# Patient Record
Sex: MALE | Race: BLACK OR AFRICAN AMERICAN | Employment: OTHER | URBAN - METROPOLITAN AREA
[De-identification: names, ages, dates, MRNs, and addresses within clinical notes are randomized per-mention and may not be internally consistent; named-entity substitution may affect disease eponyms.]

---

## 2020-10-13 ENCOUNTER — HOSPITAL ENCOUNTER (EMERGENCY)
Age: 50
Discharge: HOME | End: 2020-10-13
Attending: EMERGENCY MEDICINE

## 2020-10-13 VITALS
BODY MASS INDEX: 24.92 KG/M2 | DIASTOLIC BLOOD PRESSURE: 84 MMHG | SYSTOLIC BLOOD PRESSURE: 135 MMHG | RESPIRATION RATE: 16 BRPM | TEMPERATURE: 97.3 F | HEART RATE: 74 BPM | WEIGHT: 178 LBS | HEIGHT: 71 IN | OXYGEN SATURATION: 99 %

## 2020-10-13 PROCEDURE — 6370000000 HC RX 637 (ALT 250 FOR IP): Performed by: STUDENT IN AN ORGANIZED HEALTH CARE EDUCATION/TRAINING PROGRAM

## 2020-10-13 PROCEDURE — 99282 EMERGENCY DEPT VISIT SF MDM: CPT

## 2020-10-13 RX ORDER — CYCLOBENZAPRINE HCL 10 MG
10 TABLET ORAL ONCE
Status: COMPLETED | OUTPATIENT
Start: 2020-10-13 | End: 2020-10-13

## 2020-10-13 RX ORDER — CYCLOBENZAPRINE HCL 10 MG
10 TABLET ORAL 3 TIMES DAILY PRN
Qty: 21 TABLET | Refills: 0 | Status: SHIPPED | OUTPATIENT
Start: 2020-10-13 | End: 2020-10-23

## 2020-10-13 RX ORDER — KETOROLAC TROMETHAMINE 30 MG/ML
30 INJECTION, SOLUTION INTRAMUSCULAR; INTRAVENOUS ONCE
Status: DISCONTINUED | OUTPATIENT
Start: 2020-10-13 | End: 2020-10-13 | Stop reason: HOSPADM

## 2020-10-13 RX ADMIN — CYCLOBENZAPRINE 10 MG: 10 TABLET, FILM COATED ORAL at 13:08

## 2020-10-13 ASSESSMENT — ENCOUNTER SYMPTOMS
ABDOMINAL PAIN: 0
CONSTIPATION: 0
SORE THROAT: 0
VOMITING: 0
SHORTNESS OF BREATH: 0
DIARRHEA: 0
NAUSEA: 0

## 2020-10-13 NOTE — ED PROVIDER NOTES
none    Jamil Blackman MD, Riky Khanna  Attending Emergency  Physician             Jamil Blackman MD  10/13/20 3765

## 2020-10-13 NOTE — ED NOTES
[facility]  [address]  Phone: [phone]  Fax: [phone]    Patient Appointment Information    To schedule an appointment at the [facility] for a patient please call:    [phone] for [address] / [phone] for Adults    Appointments for children are available the day the call is placed. Adult appointments are generally available within 48 to 72 hours. Information required making an appointment:    Jude Juarez  44 y.o. 1970 [phone] (home)    Chief Complaint   Patient presents with    Dental Pain     2 teeth causing pain \"for years\" Generalized body aches         Appointment day and time: October 19, 2020at 10 am    Please as the patient to bring Medicaid card, Medicaid HMO card, or other insurance card. For self-pay, cost of emergency appointment is $38 for adults or $25 for children age 21 and under. [facility] is not a free clinic and fees are due at time of service.       Signature:  Wendy Hinds Date:  10/13/20     Wendy Hinds RN  10/13/20 4989

## 2020-10-13 NOTE — ED NOTES
Bilateral velcro wrist splints applied. Circulation checks WNL. Patient stated understanding of application and use.      Elida Hall RN  10/13/20 6509

## 2020-10-13 NOTE — ED NOTES
[facility]  [address]  Phone: [phone]  Fax: [phone]    Patient Appointment Information    To schedule an appointment at the [facility] for a patient please call:    [phone] for [address] / [phone] for Adults    Appointments for children are available the day the call is placed. Adult appointments are generally available within 48 to 72 hours. Information required making an appointment:    Bhavin Rascon  44 y.o. 1970 [phone] (home)    Chief Complaint   Patient presents with    Dental Pain     2 teeth causing pain \"for years\" Generalized body aches         Appointment day and time: October 19,2020 at 10:00 AM    Please as the patient to bring Medicaid card, Medicaid HMO card, or other insurance card. For self-pay, cost of emergency appointment is $38 for adults or $25 for children age 21 and under. [facility] is not a free clinic and fees are due at time of service.       Signature:  Chilango Moran Date:  10/13/20     Chilango Moran RN  10/13/20 9499

## 2020-10-13 NOTE — ED PROVIDER NOTES
[facility] ED  Emergency Department Encounter  EmergencyMedicine Resident     Pt Paula Barahona  MRN: [id number]  [place] 1970  Date of evaluation: 10/13/20  PCP:  No primary care provider on file. CHIEF COMPLAINT       Chief Complaint   Patient presents with    Dental Pain     2 teeth causing pain \"for years\" Generalized body aches       HISTORY OF PRESENT ILLNESS  (Location/Symptom, Timing/Onset, Context/Setting, Quality, Duration, Modifying Factors, Severity.)      Eugenie Corbin is a 44 y.o. male history hypertension who presents with bilateral upper extremity body aches and chronic dental pain. Patient reports being new to the area and newly has insurance, would like to get his problems taken care of. Also requesting a work note. 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 Associated with intermittent numbness and tingling of the first through third digits bilaterally of the upper extremities. Requesting assistance with establishing care with a primary care physician and a dentist.    Not associated with fever, chills, nausea, vomiting, chest pain, shortness breath, abdominal pain, changes in  or GI habits. PAST MEDICAL / SURGICAL / SOCIAL / FAMILY HISTORY      has a past medical history of Hypertension. has a past surgical history that includes Abdominal exploration surgery (1996) and Abdomen surgery.     Social History     Socioeconomic History    Marital status: Single     Spouse name: Not on file    Number of children: Not on file    Years of education: Not on file    Highest education level: Not on file   Occupational History    Not on file   Social Needs    Financial resource strain: Not on file    Food insecurity     Worry: Not on file     Inability: Not on file    Transportation needs     Medical: Not on file     Non-medical: Not on file   Tobacco Use    Smoking status: Former Smoker    Smokeless tobacco: Never Used   Substance and Sexual Activity    Alcohol use: Yes     Comment: occasionally    Drug use: Never    Sexual activity: Not on file   Lifestyle    Physical activity     Days per week: Not on file     Minutes per session: Not on file    Stress: Not on file   Relationships    Social connections     Talks on phone: Not on file     Gets together: Not on file     Attends Voodoo service: Not on file     Active member of club or organization: Not on file     Attends meetings of clubs or organizations: Not on file     Relationship status: Not on file    Intimate partner violence     Fear of current or ex partner: Not on file     Emotionally abused: Not on file     Physically abused: Not on file     Forced sexual activity: Not on file   Other Topics Concern    Not on file   Social History Narrative    Not on file       History reviewed. No pertinent family history. Allergies:  Penicillins    Home Medications:  Prior to Admission medications    Medication Sig Start Date End Date Taking? Authorizing Provider   cyclobenzaprine (FLEXERIL) 10 MG tablet Take 1 tablet by mouth 3 times daily as needed for Muscle spasms 10/13/20 10/23/20 Yes Mallory Gil MD       REVIEW OF SYSTEMS    (2-9 systems for level 4, 10 or more for level 5)      Review of Systems   Constitutional: Negative for fever. HENT: Positive for dental problem. Negative for sore throat. Eyes: Negative for visual disturbance. Respiratory: Negative for shortness of breath. Cardiovascular: Negative for chest pain. Gastrointestinal: Negative for abdominal pain, constipation, diarrhea, nausea and vomiting. Genitourinary: Negative for decreased urine volume. Musculoskeletal: Positive for arthralgias and myalgias. Skin: Negative for wound. Neurological: Negative for weakness, light-headedness and headaches. Psychiatric/Behavioral: Negative for confusion.        PHYSICAL EXAM   (up to 7 for level 4, 8 or more for level 5) INITIAL VITALS:   /84   Pulse 74   Temp 97.3 °F (36.3 °C) (Oral)   Resp 16   Ht 5' 11\" (1.803 m)   Wt 178 lb (80.7 kg)   SpO2 99%   BMI 24.83 kg/m²     Physical Exam  Vitals signs and nursing note reviewed. Constitutional:       Appearance: Normal appearance. He is well-developed. HENT:      Head: Normocephalic and atraumatic. Right Ear: External ear normal.      Left Ear: External ear normal.      Nose: Nose normal.      Mouth/Throat:      Lips: Pink. Mouth: Mucous membranes are moist.      Dentition: Abnormal dentition. Dental caries present. No dental tenderness. Eyes:      General:         Right eye: No discharge. Left eye: No discharge. Extraocular Movements: Extraocular movements intact. Pupils: Pupils are equal, round, and reactive to light. Neck:      Musculoskeletal: Normal range of motion. Trachea: Trachea normal. No tracheal deviation. Cardiovascular:      Rate and Rhythm: Normal rate. Pulmonary:      Effort: Pulmonary effort is normal. No respiratory distress. Abdominal:      Palpations: Abdomen is soft. Tenderness: There is no abdominal tenderness. There is no guarding. Musculoskeletal: Normal range of motion. General: No deformity. Skin:     General: Skin is warm and dry. Capillary Refill: Capillary refill takes less than 2 seconds. Neurological:      Mental Status: He is alert and oriented to person, place, and time. Psychiatric:         Behavior: Behavior normal. Behavior is cooperative.          DIFFERENTIAL  DIAGNOSIS     PLAN (LABS / IMAGING / EKG):  Orders Placed This Encounter   Procedures    Misc nursing order (specify)    [employer] Wrist Brace, Left    [employer] Wrist Brace, Right       MEDICATIONS ORDERED:  Orders Placed This Encounter   Medications    cyclobenzaprine (FLEXERIL) tablet 10 mg    ketorolac (TORADOL) injection 30 mg    cyclobenzaprine (FLEXERIL) 10 MG tablet     Sig: Take 1 tablet by mouth 3 times daily as needed for Muscle spasms     Dispense:  21 tablet     Refill:  0       DDX: Sprain versus strain versus carpal tunnel versus chronic radiculopathy versus dental caries versus periapical abscess versus other    DIAGNOSTIC RESULTS / EMERGENCY DEPARTMENT COURSE / MDM     LABS:  No results found for this visit on 10/13/20. RADIOLOGY:  None    EKG  None    All EKG's are interpreted by the Emergency Department Physician who either signs or Co-signs this chart in the absence of a cardiologist.    EMERGENCY DEPARTMENT COURSE:  Patient found seated upright in bed, no acute distress, not ill or toxic appearing. Engaged and cooperative in exam.  Physical exam notable for trapezius muscle spasm bilaterally with no midline cervical spine tenderness. Positive Tinel and Phalen's test bilaterally. Test elicits numbness and tingling involving the bilateral hands and forearms. As the patient has had no recent or acute trauma and this is been a progressively worsening problem over the last months to years do not think imaging is indicated at this time. Believe symptoms are consistent with muscle strain and bilateral carpal tunnel. Patient needs to follow-up with a PCP, referral provided. We will treat symptomatically with cyclobenzaprine and OTC analgesics as needed. Bilateral wrist splints provided. No signs of periapical abscess but poor dentition appreciated. Referral provided to establish care with a dentist.    Discharge plan discussed with patient who is in agreement. Educated on likely pathology, medications, return precautions, and follow-up. Patient understood all educated materials with all questions answered to their satisfaction. PROCEDURES:  None    CONSULTS:  None    CRITICAL CARE:  None    FINAL IMPRESSION      1. Pain, dental    2. Bilateral carpal tunnel syndrome    3.  Spasm of muscle          DISPOSITION / PLAN     DISPOSITION discharge home with referral to establish care with PCP      PATIENT REFERRED TO:  [facility]  [address] [address]  [phone]  Schedule an appointment as soon as possible for a visit   To establish care, Regarding this visit    [facility]  [address] [address]  [phone]  Go to   If symptoms worsen      DISCHARGE MEDICATIONS:  New Prescriptions    CYCLOBENZAPRINE (FLEXERIL) 10 MG TABLET    Take 1 tablet by mouth 3 times daily as needed for Muscle spasms       Zakiya Cabral MD  Emergency Medicine Resident    (Please note that portions of thisnote were completed with a voice recognition program.  Efforts were made to edit the dictations but occasionally words are mis-transcribed.)        Zakiya Cabral MD  Resident  10/13/20 3236

## 2021-07-07 ENCOUNTER — APPOINTMENT (OUTPATIENT)
Dept: GENERAL RADIOLOGY | Age: 51
End: 2021-07-07

## 2021-07-07 ENCOUNTER — HOSPITAL ENCOUNTER (EMERGENCY)
Age: 51
Discharge: HOME | End: 2021-07-07
Attending: EMERGENCY MEDICINE

## 2021-07-07 VITALS
SYSTOLIC BLOOD PRESSURE: 153 MMHG | HEART RATE: 83 BPM | DIASTOLIC BLOOD PRESSURE: 107 MMHG | TEMPERATURE: 98.6 F | OXYGEN SATURATION: 100 % | RESPIRATION RATE: 22 BRPM

## 2021-07-07 DIAGNOSIS — H81.10 BENIGN PAROXYSMAL POSITIONAL VERTIGO, UNSPECIFIED LATERALITY: Primary | ICD-10-CM

## 2021-07-07 LAB
ABSOLUTE EOS #: 0.26 K/UL (ref 0–0.44)
ABSOLUTE IMMATURE GRANULOCYTE: <0.03 K/UL (ref 0–0.3)
ABSOLUTE LYMPH #: 2.08 K/UL (ref 1.1–3.7)
ABSOLUTE MONO #: 0.57 K/UL (ref 0.1–1.2)
ANION GAP SERPL CALCULATED.3IONS-SCNC: 9 MMOL/L (ref 9–17)
BASOPHILS # BLD: 1 % (ref 0–2)
BASOPHILS ABSOLUTE: 0.04 K/UL (ref 0–0.2)
BUN BLDV-MCNC: 11 MG/DL (ref 6–20)
BUN/CREAT BLD: ABNORMAL (ref 9–20)
CALCIUM SERPL-MCNC: 9.5 MG/DL (ref 8.6–10.4)
CHLORIDE BLD-SCNC: 105 MMOL/L (ref 98–107)
CO2: 24 MMOL/L (ref 20–31)
CREAT SERPL-MCNC: 0.66 MG/DL (ref 0.7–1.2)
DIFFERENTIAL TYPE: ABNORMAL
EOSINOPHILS RELATIVE PERCENT: 5 % (ref 1–4)
GFR AFRICAN AMERICAN: >60 ML/MIN
GFR NON-AFRICAN AMERICAN: >60 ML/MIN
GFR SERPL CREATININE-BSD FRML MDRD: ABNORMAL ML/MIN/{1.73_M2}
GFR SERPL CREATININE-BSD FRML MDRD: ABNORMAL ML/MIN/{1.73_M2}
GLUCOSE BLD-MCNC: 101 MG/DL (ref 70–99)
HCT VFR BLD CALC: 40.1 % (ref 40.7–50.3)
HEMOGLOBIN: 13.2 G/DL (ref 13–17)
IMMATURE GRANULOCYTES: 0 %
LYMPHOCYTES # BLD: 39 % (ref 24–43)
MCH RBC QN AUTO: 27.3 PG (ref 25.2–33.5)
MCHC RBC AUTO-ENTMCNC: 32.9 G/DL (ref 28.4–34.8)
MCV RBC AUTO: 83 FL (ref 82.6–102.9)
MONOCYTES # BLD: 11 % (ref 3–12)
NRBC AUTOMATED: 0 PER 100 WBC
PDW BLD-RTO: 14.5 % (ref 11.8–14.4)
PLATELET # BLD: 345 K/UL (ref 138–453)
PLATELET ESTIMATE: ABNORMAL
PMV BLD AUTO: 9.2 FL (ref 8.1–13.5)
POTASSIUM SERPL-SCNC: 4.1 MMOL/L (ref 3.7–5.3)
RBC # BLD: 4.83 M/UL (ref 4.21–5.77)
RBC # BLD: ABNORMAL 10*6/UL
SEG NEUTROPHILS: 44 % (ref 36–65)
SEGMENTED NEUTROPHILS ABSOLUTE COUNT: 2.32 K/UL (ref 1.5–8.1)
SODIUM BLD-SCNC: 138 MMOL/L (ref 135–144)
TROPONIN INTERP: NORMAL
TROPONIN INTERP: NORMAL
TROPONIN T: NORMAL NG/ML
TROPONIN T: NORMAL NG/ML
TROPONIN, HIGH SENSITIVITY: 8 NG/L (ref 0–22)
TROPONIN, HIGH SENSITIVITY: 9 NG/L (ref 0–22)
WBC # BLD: 5.3 K/UL (ref 3.5–11.3)
WBC # BLD: ABNORMAL 10*3/UL

## 2021-07-07 PROCEDURE — 93005 ELECTROCARDIOGRAM TRACING: CPT | Performed by: STUDENT IN AN ORGANIZED HEALTH CARE EDUCATION/TRAINING PROGRAM

## 2021-07-07 PROCEDURE — 80048 BASIC METABOLIC PNL TOTAL CA: CPT

## 2021-07-07 PROCEDURE — 99285 EMERGENCY DEPT VISIT HI MDM: CPT

## 2021-07-07 PROCEDURE — 84484 ASSAY OF TROPONIN QUANT: CPT

## 2021-07-07 PROCEDURE — 6370000000 HC RX 637 (ALT 250 FOR IP): Performed by: STUDENT IN AN ORGANIZED HEALTH CARE EDUCATION/TRAINING PROGRAM

## 2021-07-07 PROCEDURE — 85025 COMPLETE CBC W/AUTO DIFF WBC: CPT

## 2021-07-07 PROCEDURE — 71046 X-RAY EXAM CHEST 2 VIEWS: CPT

## 2021-07-07 RX ORDER — DIAZEPAM 2 MG/1
2 TABLET ORAL ONCE
Status: COMPLETED | OUTPATIENT
Start: 2021-07-07 | End: 2021-07-07

## 2021-07-07 RX ORDER — DIAZEPAM 5 MG/1
5 TABLET ORAL ONCE
Status: DISCONTINUED | OUTPATIENT
Start: 2021-07-07 | End: 2021-07-07

## 2021-07-07 RX ORDER — MECLIZINE HCL 12.5 MG/1
25 TABLET ORAL ONCE
Status: COMPLETED | OUTPATIENT
Start: 2021-07-07 | End: 2021-07-07

## 2021-07-07 RX ORDER — DIAZEPAM 2 MG/1
2 TABLET ORAL EVERY 12 HOURS PRN
Qty: 10 TABLET | Refills: 0 | Status: SHIPPED | OUTPATIENT
Start: 2021-07-07 | End: 2021-07-12

## 2021-07-07 RX ADMIN — MECLIZINE HCL 25 MG: 12.5 TABLET ORAL at 13:22

## 2021-07-07 RX ADMIN — DIAZEPAM 2 MG: 2 TABLET ORAL at 15:32

## 2021-07-07 ASSESSMENT — ENCOUNTER SYMPTOMS
COUGH: 0
BACK PAIN: 0
DIARRHEA: 0
SORE THROAT: 0
CONSTIPATION: 0
VOMITING: 0
SHORTNESS OF BREATH: 0
ABDOMINAL PAIN: 0
RHINORRHEA: 0
NAUSEA: 0

## 2021-07-07 ASSESSMENT — PAIN SCALES - GENERAL: PAINLEVEL_OUTOF10: 5

## 2021-07-07 NOTE — ED PROVIDER NOTES
left side but not always associated with the vertigo. Nonradiating no shortness of breath. On exam well-appearing talking with his wife. Does have lateral nystagmus with gaze that reproduces his symptoms exacerbated by head position and tilt with [place]. Lungs clear heart normal equal radial pulses abdomen soft nontender no pulsatile abdominal mass. Neck nontender. 5-5 strength upper lower extremities normal mental status normal pupils normal speech.   Will medicate, chest x-ray labs EKG, reevaluate    EKG interpretation: Sinus from 84 normal intervals normal axis no acute ST or T changes normal EKG    Critical Care     none    Verito Piper MD, Diane Strong  Attending Emergency  Physician             Verito Piper MD  07/07/21 6584

## 2021-07-07 NOTE — ED PROVIDER NOTES
[facility] ED  Emergency Department Encounter  Emergency Medicine Resident     Pt Name: Emilie Espinoza  MRN: [id number]  [place] 1970  Date of evaluation: 7/7/21  PCP:  No primary care provider on file. CHIEF COMPLAINT       Chief Complaint   Patient presents with    Chest Pain    Dizziness       HISTORY OFPRESENT ILLNESS  (Location/Symptom, Timing/Onset, Context/Setting, Quality, Duration, Modifying Phani Banana.)      Emilie Espinoza is a 44 y.o. male with past medical history of hypertension who presents with 1 week of intermittent dizziness and chest pain. Patient describes the dizziness as room spinning and \"like I am on a carousel\". States the dizziness makes him significantly nauseated but he has not vomited. States if he holds his head completely still the dizziness will improve but does not completely go away. Patient also reports chest pain which began around the same time. He describes the chest pain as left-sided and burning and is unsure if it is related to the dizziness. He denies associated diaphoresis, nausea, vomiting, shortness of breath or palpitations when he gets the chest pain. He denies fevers, rhinorrhea, congestion, abdominal pain, diarrhea, constipation or urinary symptoms. Patient has not been taking his medications for high blood pressure. He smokes 2 to 3 cigarettes a day but reports he quit smoking a few days ago. He also reports recent travel to [place] but denies recent surgery, exogenous hormone use, history of DVT/PE or history of cancer. Patient has not had a stress test or echo. PAST MEDICAL / SURGICAL / SOCIAL / FAMILY HISTORY      has a past medical history of Hypertension. has a past surgical history that includes Abdominal exploration surgery (1996) and Abdomen surgery. Social:  reports that he has quit smoking. He has never used smokeless tobacco. He reports current alcohol use.  He reports that he does not use drugs. Family Hx: History reviewed. No pertinent family history. Allergies:  Penicillins    Home Medications:  Prior to Admission medications    Medication Sig Start Date End Date Taking? Authorizing Provider   diazePAM (VALIUM) 2 MG tablet Take 1 tablet by mouth every 12 hours as needed (dizziness) for up to 5 days. 7/7/21 7/12/21 Yes Toni Flattery, DO   carbamide peroxide (DEBROX) 6.5 % otic solution Place 5 drops into both ears 2 times daily for 5 days Let sit for 30 minutes then clean ears with tissues. Do not use Qtips 7/7/21 7/12/21 Yes Toni Flattery, DO       REVIEW OFSYSTEMS    (2-9 systems for level 4, 10 or more for level 5)      Review of Systems   Constitutional: Negative for chills and fever. HENT: Negative for congestion, rhinorrhea and sore throat. Respiratory: Negative for cough and shortness of breath. Cardiovascular: Positive for chest pain. Negative for leg swelling. Gastrointestinal: Negative for abdominal pain, constipation, diarrhea, nausea and vomiting. Genitourinary: Negative for decreased urine volume, difficulty urinating, flank pain, frequency and hematuria. Musculoskeletal: Negative for arthralgias, back pain and neck pain. Skin: Negative for rash. Neurological: Positive for dizziness. Negative for weakness, numbness and headaches. Psychiatric/Behavioral: Negative for confusion. All other systems reviewed and are negative. PHYSICAL EXAM   (up to 7 for level 4, 8 or more forlevel 5)      INITIAL VITALS:   Vitals:    07/07/21 1436   BP:    Pulse:    Resp:    Temp:    SpO2: 100%        Physical Exam  Vitals and nursing note reviewed. Constitutional:       General: He is not in acute distress. Appearance: He is not toxic-appearing. Comments: Adult male sitting in stretcher no acute distress. He speaks in full sentences and answers questions appropriately. HENT:      Head: Normocephalic and atraumatic.       Right Ear: Tympanic membrane, ear canal and external ear normal. There is impacted cerumen. Left Ear: Tympanic membrane, ear canal and external ear normal. There is impacted cerumen. Ears:      Comments: Bilateral TMs are occluded with wax     Nose: Nose normal.      Mouth/Throat:      Mouth: Mucous membranes are moist.      Pharynx: Oropharynx is clear. Eyes:      Extraocular Movements: Extraocular movements intact. Conjunctiva/sclera: Conjunctivae normal.      Pupils: Pupils are equal, round, and reactive to light. Cardiovascular:      Rate and Rhythm: Normal rate and regular rhythm. Heart sounds: No murmur heard. No friction rub. No gallop. Pulmonary:      Effort: Pulmonary effort is normal. No respiratory distress. Breath sounds: Normal breath sounds. No stridor. No wheezing, rhonchi or rales. Abdominal:      General: Abdomen is flat. There is no distension. Palpations: Abdomen is soft. Tenderness: There is no abdominal tenderness. There is no guarding. Musculoskeletal:         General: Normal range of motion. Cervical back: Neck supple. No rigidity. No muscular tenderness. Right lower leg: No edema. Left lower leg: No edema. Skin:     General: Skin is warm and dry. Capillary Refill: Capillary refill takes less than 2 seconds. Findings: No rash. Neurological:      General: No focal deficit present. Mental Status: He is alert. Comments: Alert and oriented x3, answers questions appropriately, speech clear  CN 2-12 intact, no facial droop  Moves all extremities spontaneously  5/5 strength flexion and extension upper extremities bilaterally.   strength 5/5 and equal  5/5 strength hip flexion, knee extension, dorsi and plantar flexion bilaterally  Sensation intact to light touch extremities x4   Psychiatric:         Mood and Affect: Mood normal.         Behavior: Behavior normal.         DIFFERENTIAL  DIAGNOSIS     DDX: Vertigo, viral illness, dehydration, electrolyte abnormality, cerumen impaction, ACS, MI, pneumothorax, pleural effusion, pulmonary edema, COPD, CHF, asthma, pneumonia    Initial MDM/Plan: 44 y.o. male with past medical history of hypertension who presents with 1 week of intermittent dizziness and chest pain. Chest pain has atypical features and dizziness appears to be vertiginous, as he describes it as room spinning and is aggravated by motion. On physical exam, patient is awake, alert, well-appearing. His vitals are remarkable for mild high blood pressure and SPO2 of 94%. Lungs are clear. Will obtain cardiac work-up including labs, chest x-ray and EKG to evaluate. Will treat symptomatically for vertigo and reassess. Patient may also benefit from cerumen disimpaction. Heart score for ACS = 3  1. History - 0  Slightly suspicious: 0  Moderately suspicious: 1  Highly suspicious: 2  2. EKG - 0  Normal: 0  Non-specific repolarization disturbance:1  Significant ST depression: 2  3. Age - 1  <45: 0  45-64: 1  >65: 2  4. Risk Factors - 1  None known: 0  1-2: 1  >3: 2  5.  Initial Troponin - 1  Normal limit: 0  1-3 x normal limit: 1  >3 x normal limit: 2          DIAGNOSTIC RESULTS / EMERGENCYDEPARTMENT COURSE / MDM     LABS:  Results for orders placed or performed during the hospital encounter of 07/07/21   CBC Auto Differential   Result Value Ref Range    WBC 5.3 3.5 - 11.3 k/uL    RBC 4.83 4.21 - 5.77 m/uL    Hemoglobin 13.2 13.0 - 17.0 g/dL    Hematocrit 40.1 (L) 40.7 - 50.3 %    MCV 83.0 82.6 - 102.9 fL    MCH 27.3 25.2 - 33.5 pg    MCHC 32.9 28.4 - 34.8 g/dL    RDW 14.5 (H) 11.8 - 14.4 %    Platelets 875 925 - 212 k/uL    MPV 9.2 8.1 - 13.5 fL    NRBC Automated 0.0 0.0 per 100 WBC    Differential Type NOT REPORTED     Seg Neutrophils 44 36 - 65 %    Lymphocytes 39 24 - 43 %    Monocytes 11 3 - 12 %    Eosinophils % 5 (H) 1 - 4 %    Basophils 1 0 - 2 %    Immature Granulocytes 0 0 %    Segs Absolute 2.32 1.50 - 8.10 k/uL    Absolute Lymph # 2.08 1.10 - 3.70 k/uL    Absolute Mono # 0.57 0.10 - 1.20 k/uL    Absolute Eos # 0.26 0.00 - 0.44 k/uL    Basophils Absolute 0.04 0.00 - 0.20 k/uL    Absolute Immature Granulocyte <0.03 0.00 - 0.30 k/uL    WBC Morphology NOT REPORTED     RBC Morphology ANISOCYTOSIS PRESENT     Platelet Estimate NOT REPORTED    Basic Metabolic Panel w/ Reflex to MG   Result Value Ref Range    Glucose 101 (H) 70 - 99 mg/dL    BUN 11 6 - 20 mg/dL    CREATININE 0.66 (L) 0.70 - 1.20 mg/dL    Bun/Cre Ratio NOT REPORTED 9 - 20    Calcium 9.5 8.6 - 10.4 mg/dL    Sodium 138 135 - 144 mmol/L    Potassium 4.1 3.7 - 5.3 mmol/L    Chloride 105 98 - 107 mmol/L    CO2 24 20 - 31 mmol/L    Anion Gap 9 9 - 17 mmol/L    GFR Non-African American >60 >60 mL/min    GFR African American >60 >60 mL/min    GFR Comment          GFR Staging NOT REPORTED    Troponin   Result Value Ref Range    Troponin, High Sensitivity 9 0 - 22 ng/L    Troponin T NOT REPORTED <0.03 ng/mL    Troponin Interp NOT REPORTED    Troponin   Result Value Ref Range    Troponin, High Sensitivity 8 0 - 22 ng/L    Troponin T NOT REPORTED <0.03 ng/mL    Troponin Interp NOT REPORTED    EKG 12 Lead   Result Value Ref Range    Ventricular Rate 84 BPM    Atrial Rate 84 BPM    P-R Interval 158 ms    QRS Duration 90 ms    Q-T Interval 346 ms    QTc Calculation (Bazett) 408 ms    P Axis 68 degrees    R Axis 39 degrees    T Axis 37 degrees         RADIOLOGY:  XR CHEST (2 VW)    Result Date: 7/7/2021  EXAMINATION: TWO XRAY VIEWS OF THE CHEST 7/7/2021 1:56 pm COMPARISON: None. HISTORY: ORDERING SYSTEM PROVIDED HISTORY: chest pain TECHNOLOGIST PROVIDED HISTORY: chest pain FINDINGS: Cardiomediastinal silhouette and pulmonary vasculature are within normal limits. No focal airspace consolidation, pneumothorax, or pleural effusion. No free air beneath the diaphragm. No acute osseous abnormality. No acute intrathoracic process.        EKG  Normal sinus rhythm, rate: 84  IA: 158  QRS: 90  QT/QTc: 346/408  No ST elevation or depression  No T wave abnormality    All EKG's are interpreted by the Emergency Department Physicianwho either signs or Co-signs this chart in the absence of a cardiologist.    MEDICATIONS ORDERED:  Orders Placed This Encounter   Medications    meclizine (ANTIVERT) tablet 25 mg    DISCONTD: diazePAM (VALIUM) tablet 5 mg    diazePAM (VALIUM) tablet 2 mg    diazePAM (VALIUM) 2 MG tablet     Sig: Take 1 tablet by mouth every 12 hours as needed (dizziness) for up to 5 days. Dispense:  10 tablet     Refill:  0    carbamide peroxide (DEBROX) 6.5 % otic solution     Sig: Place 5 drops into both ears 2 times daily for 5 days Let sit for 30 minutes then clean ears with tissues. Do not use Qtips     Dispense:  15 mL     Refill:  0         PROCEDURES:  None      CONSULTS:  None      EMERGENCY DEPARTMENT COURSE:  ED Course as of Jul 08 1007 Wed Jul 07, 2021   1530 Meclizine with no effect. Will try low-dose of Valium at this time. [KA]   1600 Patient reports resolution of symptoms with Valium. He states that he is hungry and ready to be discharged. Pending repeat troponin    [KA]   1630 Initial 9   Troponin, High Sensitivity: 8     Patient is stable for discharge at this time. He was given a short prescription for Valium and commended to only use it if he is getting another vertigo attack. Discussed that this medication is habit-forming and he should not use it before driving or operating heavy machinery. We will also not prevent vertigo. Patient was also prescribed Debrox drops to help with his bilateral cerumen impaction which may be contributing to his dizziness. Was also provided with exercises which he can do to prevent and during an attack. Discussed with patient that his reactive work-up was negative today but that he should follow-up with his primary care physician to schedule an outpatient stress test and possible echo.   If any of his chest pain returns he is to return to the emergency department for further evaluation. [KA]      ED Course User Index  [KA] Arthur Luna DO          FINAL IMPRESSION      1. Benign paroxysmal positional vertigo, unspecified laterality          DISPOSITION / PLAN     DISPOSITION  Discharge      PATIENT REFERRED TO:  [address]  [address] [facility] [address] [phone]  Schedule an appointment as soon as possible for a visit       [facility] ED  [address] [facility] [address] [phone]    As needed      DISCHARGE MEDICATIONS:  Discharge Medication List as of 7/7/2021  4:47 PM      START taking these medications    Details   diazePAM (VALIUM) 2 MG tablet Take 1 tablet by mouth every 12 hours as needed (dizziness) for up to 5 days. , Disp-10 tablet, R-0Print      carbamide peroxide (DEBROX) 6.5 % otic solution Place 5 drops into both ears 2 times daily for 5 days Let sit for 30 minutes then clean ears with tissues.  Do not use Qtips, Disp-15 mL, R-0Print             Arthur Luna DO  Emergency Medicine Resident    (Please note that portions of this note were completed with a voice recognition program.Efforts were made to edit the dictations but occasionally words are mis-transcribed.)       Arthur Luna DO  Resident  07/08/21 1010

## 2021-07-08 LAB
EKG ATRIAL RATE: 84 BPM
EKG P AXIS: 68 DEGREES
EKG P-R INTERVAL: 158 MS
EKG Q-T INTERVAL: 346 MS
EKG QRS DURATION: 90 MS
EKG QTC CALCULATION (BAZETT): 408 MS
EKG R AXIS: 39 DEGREES
EKG T AXIS: 37 DEGREES
EKG VENTRICULAR RATE: 84 BPM

## 2021-07-08 PROCEDURE — 93010 ELECTROCARDIOGRAM REPORT: CPT | Performed by: INTERNAL MEDICINE

## 2021-10-13 ENCOUNTER — HOSPITAL ENCOUNTER (EMERGENCY)
Age: 51
Discharge: HOME | End: 2021-10-13
Attending: EMERGENCY MEDICINE

## 2021-10-13 VITALS
DIASTOLIC BLOOD PRESSURE: 148 MMHG | OXYGEN SATURATION: 98 % | HEART RATE: 64 BPM | HEIGHT: 71 IN | BODY MASS INDEX: 27.72 KG/M2 | TEMPERATURE: 97.8 F | WEIGHT: 198 LBS | SYSTOLIC BLOOD PRESSURE: 192 MMHG | RESPIRATION RATE: 18 BRPM

## 2021-10-13 DIAGNOSIS — K08.89 PAIN, DENTAL: Primary | ICD-10-CM

## 2021-10-13 DIAGNOSIS — K02.9 DENTAL DECAY: ICD-10-CM

## 2021-10-13 PROCEDURE — 99283 EMERGENCY DEPT VISIT LOW MDM: CPT

## 2021-10-13 PROCEDURE — 6370000000 HC RX 637 (ALT 250 FOR IP): Performed by: STUDENT IN AN ORGANIZED HEALTH CARE EDUCATION/TRAINING PROGRAM

## 2021-10-13 RX ORDER — CLINDAMYCIN HYDROCHLORIDE 150 MG/1
450 CAPSULE ORAL 4 TIMES DAILY
Qty: 84 CAPSULE | Refills: 0 | Status: SHIPPED | OUTPATIENT
Start: 2021-10-13 | End: 2021-10-20

## 2021-10-13 RX ORDER — NAPROXEN 500 MG/1
500 TABLET ORAL 2 TIMES DAILY WITH MEALS
Qty: 30 TABLET | Refills: 0 | OUTPATIENT
Start: 2021-10-13 | End: 2022-03-15

## 2021-10-13 RX ORDER — NAPROXEN 250 MG/1
500 TABLET ORAL ONCE
Status: COMPLETED | OUTPATIENT
Start: 2021-10-13 | End: 2021-10-13

## 2021-10-13 RX ORDER — CLINDAMYCIN HYDROCHLORIDE 150 MG/1
450 CAPSULE ORAL ONCE
Status: COMPLETED | OUTPATIENT
Start: 2021-10-13 | End: 2021-10-13

## 2021-10-13 RX ADMIN — CLINDAMYCIN HYDROCHLORIDE 450 MG: 150 CAPSULE ORAL at 22:09

## 2021-10-13 RX ADMIN — NAPROXEN 500 MG: 250 TABLET ORAL at 22:10

## 2021-10-13 ASSESSMENT — PAIN SCALES - GENERAL
PAINLEVEL_OUTOF10: 10
PAINLEVEL_OUTOF10: 10

## 2021-10-13 ASSESSMENT — PAIN DESCRIPTION - PAIN TYPE: TYPE: ACUTE PAIN

## 2021-10-13 ASSESSMENT — ENCOUNTER SYMPTOMS
SHORTNESS OF BREATH: 0
ABDOMINAL PAIN: 0
NAUSEA: 0
PHOTOPHOBIA: 0
VOICE CHANGE: 0
TROUBLE SWALLOWING: 0
SORE THROAT: 0

## 2021-10-13 ASSESSMENT — PAIN DESCRIPTION - LOCATION: LOCATION: FACE

## 2021-10-13 ASSESSMENT — PAIN DESCRIPTION - ORIENTATION: ORIENTATION: RIGHT

## 2021-10-14 NOTE — ED PROVIDER NOTES
[facility] ED  Emergency Department Encounter  EmergencyMedicine Resident     Pt Rosalva Wade  MRN: [id number]  [place] 1970  Date of evaluation: 10/13/21  PCP:  No primary care provider on file. This patient was evaluated in the Emergency Department for symptoms described in the history of present illness. The patient was evaluated in the context of the global COVID-19 pandemic, which necessitated consideration that the patient might be at risk for infection with the SARS-CoV-2 virus that causes COVID-19. Institutional protocols and algorithms that pertain to the evaluation of patients at risk for COVID-19 are in a state of rapid change based on information released by regulatory bodies including the CDC and federal and state organizations. These policies and algorithms were followed during the patient's care in the ED. CHIEF COMPLAINT       Chief Complaint   Patient presents with    Dental Pain     c/o R lower dental pain       HISTORY OF PRESENT ILLNESS  (Location/Symptom, Timing/Onset, Context/Setting, Quality, Duration, Modifying Factors, Severity.)      Osmel Montoya is a 46 y.o. male who presents with several weeks of right upper and right lower sided dental pain patient has tooth decay in that area and no abscess no drainage tolerating p.o. uncomfortable was seen by another provider several weeks ago started on a course of antibiotics which she completed without missing any doses has not been able to follow-up with a dentist.  There is no chills no abdominal pain no chest pain shortness of breath no vomiting    PAST MEDICAL / SURGICAL / SOCIAL / FAMILY HISTORY      has a past medical history of Hypertension. has a past surgical history that includes Abdominal exploration surgery (1996) and Abdomen surgery.       Social History     Socioeconomic History    Marital status: Single     Spouse name: Not on file    Number of children: Not on file    Years of education: Not on file    Highest education level: Not on file   Occupational History    Not on file   Tobacco Use    Smoking status: Former Smoker    Smokeless tobacco: Never Used   Vaping Use    Vaping Use: Never used   Substance and Sexual Activity    Alcohol use: Yes     Comment: occasionally    Drug use: Never    Sexual activity: Not on file   Other Topics Concern    Not on file   Social History Narrative    Not on file     Social Determinants of Health     Financial Resource Strain:     Difficulty of Paying Living Expenses:    Food Insecurity:     Worried About Running Out of Food in the Last Year:     [address] in the Last Year:    Transportation Needs:     Lack of Transportation (Medical):  Lack of Transportation (Non-Medical):    Physical Activity:     Days of Exercise per Week:     Minutes of Exercise per Session:    Stress:     Feeling of Stress :    Social Connections:     Frequency of Communication with Friends and Family:     Frequency of Social Gatherings with Friends and Family:     Attends Yazidi Services:     Active Member of Clubs or Organizations:     Attends Club or Organization Meetings:     Marital Status:    Intimate Partner Violence:     Fear of Current or Ex-Partner:     Emotionally Abused:     Physically Abused:     Sexually Abused:        History reviewed. No pertinent family history. Allergies:  Penicillins    Home Medications:  Prior to Admission medications    Medication Sig Start Date End Date Taking? Authorizing Provider   clindamycin (CLEOCIN) 150 MG capsule Take 3 capsules by mouth 4 times daily for 7 days 10/13/21 10/20/21 Yes Pelon Luis,    naproxen (NAPROSYN) 500 MG tablet Take 1 tablet by mouth 2 times daily (with meals) 10/13/21  Yes Pelon Del Rosario,        REVIEW OF SYSTEMS    (2-9 systems for level 4, 10 or more for level 5)      Review of Systems   Constitutional: Negative for fever. HENT: Positive for dental problem.  Negative for sore throat, trouble swallowing and voice change. Eyes: Negative for photophobia and visual disturbance. Respiratory: Negative for shortness of breath. Cardiovascular: Negative. Gastrointestinal: Negative for abdominal pain and nausea. Musculoskeletal: Negative for gait problem and neck stiffness. Allergic/Immunologic: Negative for food allergies. Neurological: Negative for dizziness and headaches. Hematological: Positive for adenopathy. Psychiatric/Behavioral: Negative for confusion. PHYSICAL EXAM   (up to 7 for level 4, 8 or more for level 5)      INITIAL VITALS:   BP (!) 192/148   Pulse 64   Temp 97.8 °F (36.6 °C)   Resp 18   Ht 5' 11\" (1.803 m)   Wt 198 lb (89.8 kg)   SpO2 98%   BMI 27.62 kg/m²     Physical Exam  Vitals and nursing note reviewed. Constitutional:       Appearance: Normal appearance. HENT:      Head: Normocephalic. Right Ear: External ear normal.      Left Ear: External ear normal.      Nose: Nose normal.      Mouth/Throat:      Comments: Dental decay diffusely no trismus no elevation of floor the mouth no evidence of peritonsillar abscess full range of motion of his neck there is some cervical lymphadenopathy bilaterally. No acute swelling no signs of injury  Eyes:      Conjunctiva/sclera: Conjunctivae normal.   Cardiovascular:      Rate and Rhythm: Normal rate. Pulses: Normal pulses. Pulmonary:      Effort: Pulmonary effort is normal.   Abdominal:      Palpations: Abdomen is soft. Tenderness: There is no abdominal tenderness. Musculoskeletal:         General: Normal range of motion. Cervical back: Normal range of motion. No rigidity. Lymphadenopathy:      Cervical: Cervical adenopathy present. Skin:     General: Skin is warm. Capillary Refill: Capillary refill takes less than 2 seconds. Neurological:      Mental Status: He is alert and oriented to person, place, and time.    Psychiatric:         Mood and Affect: Mood normal.         DIFFERENTIAL  DIAGNOSIS     PLAN (LABS / IMAGING / EKG):  No orders of the defined types were placed in this encounter. MEDICATIONS ORDERED:  Orders Placed This Encounter   Medications    clindamycin (CLEOCIN) 150 MG capsule     Sig: Take 3 capsules by mouth 4 times daily for 7 days     Dispense:  84 capsule     Refill:  0    naproxen (NAPROSYN) 500 MG tablet     Sig: Take 1 tablet by mouth 2 times daily (with meals)     Dispense:  30 tablet     Refill:  0    clindamycin (CLEOCIN) capsule 450 mg     Order Specific Question:   Antimicrobial Indications     Answer:   Head and Neck Infection    naproxen (NAPROSYN) tablet 500 mg       DDX: Dental infection    DIAGNOSTIC RESULTS / EMERGENCY DEPARTMENT COURSE / MDM   LAB RESULTS:  No results found for this visit on 10/13/21. IMPRESSION: Patient is alert oriented uncomfortable nontoxic 49-year-old male with several weeks of right upper and lower dental pain no red flag symptoms offered dental block and injuries at this time will provide naproxen, antibiotics with versus given the emergency department discharged home with the same and other follow-up. RADIOLOGY:      EKG      All EKG's are interpreted by the Emergency Department Physician who either signs or Co-signs this chart in the absence of a cardiologist.    EMERGENCY DEPARTMENT COURSE:  Evaluated stable discharged home after analgesia and antibiotics    PROCEDURES:      CONSULTS:  None    CRITICAL CARE:      FINAL IMPRESSION      1. Pain, dental    2.  Dental decay          DISPOSITION / PLAN     DISPOSITION Decision To Discharge 10/13/2021 09:59:46 PM      PATIENT REFERRED TO:  [address] [facility] AT [address] [address] [phone]  Call today  for followup and reevaluation in 1-2 days    [facility]  [address] [place] [address]  [phone]  Go to   As needed, If symptoms worsen    [facility]ig[facility]  [address]  [phone]  Schedule an appointment as soon as possible for a visit   for dental followup asap      DISCHARGE MEDICATIONS:  New Prescriptions    CLINDAMYCIN (CLEOCIN) 150 MG CAPSULE    Take 3 capsules by mouth 4 times daily for 7 days    NAPROXEN (NAPROSYN) 500 MG TABLET    Take 1 tablet by mouth 2 times daily (with meals)       Freda Pollard DO  Emergency Medicine Resident    (Please note that portions of thisnote were completed with a voice recognition program.  Efforts were made to edit the dictations but occasionally words are mis-transcribed.)        Freda Pollard DO  Resident  10/13/21 7774

## 2021-10-14 NOTE — ED PROVIDER NOTES
[facility] [address] ED     Emergency Department     Faculty Attestation        I performed a history and physical examination of the patient and discussed management with the resident. I reviewed the residents note and agree with the documented findings and plan of care. Any areas of disagreement are noted on the chart. I was personally present for the key portions of any procedures. I have documented in the chart those procedures where I was not present during the key portions. I have reviewed the emergency nurses triage note. I agree with the chief complaint, past medical history, past surgical history, allergies, medications, social and family history as documented unless otherwise noted below. For Physician Assistant/ Nurse Practitioner cases/documentation I have personally evaluated this patient and have completed at least one if not all key elements of the E/M (history, physical exam, and MDM). Additional findings are as noted. PCP:  No primary care provider on file. Pertinent Comments:       Patient presents with dental pain. Denies Fevers/Chills. Physical examination:  + dental pain to percussion in the affected area. No obvious abscess. No Corwin's angina at all. Plan:  Pain control, Antibiotics, and dental followup. Critical Care  None    This patient was evaluated in the Emergency Department for symptoms described in the history of present illness. He/she was evaluated in the context of the global COVID-19 pandemic, which necessitated consideration that the patient might be at risk for infection with the SARS-CoV-2 virus that causes COVID-19. Institutional protocols and algorithms that pertain to the evaluation of patients at risk for COVID-19 are in a state of rapid change based on information released by regulatory bodies including the CDC and federal and state organizations.  These policies and algorithms were followed during the patient's care in the ED. (Please note that portions of this note were completed with a voice recognition program. Efforts were made to edit the dictations but occasionally words are mis-transcribed.  Whenever words are used in this note in any gender, they shall be construed as though they were used in the gender appropriate to the circumstances; and whenever words are used in this note in the singular or plural form, they shall be construed as though they were used in the form appropriate to the circumstances.)    MD Keven Mora  Attending Emergency Medicine Physician           Shahram Diaz MD  10/13/21 2228

## 2022-03-15 ENCOUNTER — HOSPITAL ENCOUNTER (EMERGENCY)
Age: 52
Discharge: HOME | End: 2022-03-15
Attending: EMERGENCY MEDICINE

## 2022-03-15 VITALS
RESPIRATION RATE: 20 BRPM | OXYGEN SATURATION: 99 % | HEART RATE: 76 BPM | DIASTOLIC BLOOD PRESSURE: 130 MMHG | SYSTOLIC BLOOD PRESSURE: 164 MMHG | TEMPERATURE: 98 F

## 2022-03-15 DIAGNOSIS — I10 ESSENTIAL HYPERTENSION: Primary | ICD-10-CM

## 2022-03-15 PROCEDURE — 99284 EMERGENCY DEPT VISIT MOD MDM: CPT

## 2022-03-15 PROCEDURE — 93005 ELECTROCARDIOGRAM TRACING: CPT | Performed by: STUDENT IN AN ORGANIZED HEALTH CARE EDUCATION/TRAINING PROGRAM

## 2022-03-15 RX ORDER — AMLODIPINE BESYLATE 2.5 MG/1
2.5 TABLET ORAL DAILY
Qty: 14 TABLET | Refills: 0 | Status: SHIPPED | OUTPATIENT
Start: 2022-03-15 | End: 2022-03-29

## 2022-03-15 ASSESSMENT — ENCOUNTER SYMPTOMS
SORE THROAT: 0
ABDOMINAL PAIN: 0
VOMITING: 0
SHORTNESS OF BREATH: 0
COUGH: 0

## 2022-03-15 NOTE — ED PROVIDER NOTES
[facility] ED  Emergency Department Encounter  EmergencyMedicine Resident     Pt Nik Henley  MRN: [id number]  [place] 1970  Date of evaluation: 3/15/22  PCP:  No primary care provider on file. This patient was evaluated in the Emergency Department for symptoms described in the history of present illness. The patient was evaluated in the context of the global COVID-19 pandemic, which necessitated consideration that the patient might be at risk for infection with the SARS-CoV-2 virus that causes COVID-19. Institutional protocols and algorithms that pertain to the evaluation of patients at risk for COVID-19 are in a state of rapid change based on information released by regulatory bodies including the CDC and federal and state organizations. These policies and algorithms were followed during the patient's care in the ED. CHIEF COMPLAINT       Hypertension    HISTORY OF PRESENT ILLNESS  (Location/Symptom, Timing/Onset, Context/Setting, Quality, Duration, Modifying Factors, Severity.)      Sami Larose is a 46 y.o. male who presents with concerns for high blood pressure, patient states that he does have a history of hypertension and has been previously prescribed home medication for such, however has not taken it because he feels that there are repercussions to taking medications. Patient also states that he is here because of a lump in his back that he has had for the last 2 years, however has recently been causing him pain when he leans on it. Denies having a primary care doctor or taking any daily medications. Patient otherwise denies headaches, dizziness, nausea, vomiting, chest pain, shortness of breath, abdominal pain or nausea or vomiting. PAST MEDICAL / SURGICAL / SOCIAL / FAMILY HISTORY      has a past medical history of Hypertension. has a past surgical history that includes Abdominal exploration surgery (1996) and Abdomen surgery.     Social History Socioeconomic History    Marital status: Single     Spouse name: Not on file    Number of children: Not on file    Years of education: Not on file    Highest education level: Not on file   Occupational History    Not on file   Tobacco Use    Smoking status: Former Smoker    Smokeless tobacco: Never Used   Vaping Use    Vaping Use: Never used   Substance and Sexual Activity    Alcohol use: Yes     Comment: occasionally    Drug use: Never    Sexual activity: Not on file   Other Topics Concern    Not on file   Social History Narrative    Not on file     Social Determinants of Health     Financial Resource Strain:     Difficulty of Paying Living Expenses: Not on file   Food Insecurity:     Worried About [address] [employer] in the Last Year: Not on file    Sobia of Food in the Last Year: Not on file   Transportation Needs:     Lack of Transportation (Medical): Not on file    Lack of Transportation (Non-Medical): Not on file   Physical Activity:     Days of Exercise per Week: Not on file    Minutes of Exercise per Session: Not on file   Stress:     Feeling of Stress : Not on file   Social Connections:     Frequency of Communication with Friends and Family: Not on file    Frequency of Social Gatherings with Friends and Family: Not on file    Attends [employer] Services: Not on file    Active Member of [address] [place] [address] [employer] or Organizations: Not on file    Attends Club or Organization Meetings: Not on file    Marital Status: Not on file   Intimate Partner Violence:     Fear of Current or Ex-Partner: Not on file    Emotionally Abused: Not on file    Physically Abused: Not on file    Sexually Abused: Not on file   Housing Stability:     Unable to Pay for Housing in the Last Year: Not on file    Number of [place] in the Last Year: Not on file    Unstable Housing in the Last Year: Not on file       No family history on file.     Allergies:  Penicillins    Home Medications:  Prior to Admission medications    Medication Sig Start Date End Date Taking? Authorizing Provider   amLODIPine (NORVASC) 2.5 MG tablet Take 1 tablet by mouth daily for 14 days 3/15/22 3/29/22 Yes Timi Reynolds, DO   naproxen (NAPROSYN) 500 MG tablet Take 1 tablet by mouth 2 times daily (with meals) 10/13/21 3/15/22  Patrick Dumont, DO       REVIEW OF SYSTEMS    (2-9 systems for level 4, 10 or more for level 5)      Review of Systems   Constitutional: Negative for chills and fever. HENT: Negative for sore throat. Eyes: Negative for visual disturbance. Respiratory: Negative for cough and shortness of breath. Cardiovascular: Negative for chest pain and palpitations. Gastrointestinal: Negative for abdominal pain and vomiting. Endocrine: Negative for polyuria. Genitourinary: Negative for dysuria and hematuria. Musculoskeletal: Negative for neck pain. Skin: Negative for rash. Neurological: Negative for light-headedness and headaches. Psychiatric/Behavioral: Negative for confusion. PHYSICAL EXAM   (up to 7 for level 4, 8 or more for level 5)      INITIAL VITALS:   BP (!) 164/130   Pulse 76   Temp 98 °F (36.7 °C) (Oral)   Resp 20   SpO2 99%     Physical Exam  Constitutional:       Appearance: Normal appearance. HENT:      Head: Normocephalic. Mouth/Throat:      Mouth: Mucous membranes are moist.   Eyes:      Extraocular Movements: Extraocular movements intact. Pupils: Pupils are equal, round, and reactive to light. Cardiovascular:      Rate and Rhythm: Normal rate and regular rhythm. Abdominal:      Palpations: Abdomen is soft. Tenderness: There is no abdominal tenderness. There is no right CVA tenderness or left CVA tenderness. Musculoskeletal:      Cervical back: Neck supple. No tenderness. Right lower leg: No edema. Left lower leg: No edema. Comments: 5 cm back swelling, midline thoracic, not erythematous, tender, swollen, indurated   Skin:     General: Skin is warm. Capillary Refill: Capillary refill takes less than 2 seconds. Neurological:      General: No focal deficit present. Mental Status: He is alert and oriented to person, place, and time. Psychiatric:         Mood and Affect: Mood normal.       DIFFERENTIAL  DIAGNOSIS     PLAN (LABS / IMAGING / EKG):  No orders of the defined types were placed in this encounter. MEDICATIONS ORDERED:  Orders Placed This Encounter   Medications    amLODIPine (NORVASC) 2.5 MG tablet     Sig: Take 1 tablet by mouth daily for 14 days     Dispense:  14 tablet     Refill:  0     DDX: Essential hypertension, hypertensive urgency, hypertensive emergency, back abscess, cellulitis    DIAGNOSTIC RESULTS / EMERGENCY DEPARTMENT COURSE / MDM   LAB RESULTS:  No results found for this visit on 03/15/22. IMPRESSION: 64-year-old gentleman presents to the emergency department with concerns for hypertension and a 3year-old back mass. Patient has a history of hypertension, has been prescribed medication in the past however has been noncompliant. Patient also states that he has a 5 cm back mass that has been there for 2 years, however wants to get checked out. Denies other symptoms. Vital signs grossly stable, systolic blood pressure 578. Bedside ultrasound of the 5 cm mass showing no evidence of cobblestoning, fluid, cyst or abscess. Discussed with patient with regards to follow-up with primary care doctor and for return precautions. Norvasc given in the interim. Stable for discharge. EMERGENCY DEPARTMENT COURSE:        PROCEDURES:  None    CONSULTS:  None    FINAL IMPRESSION      1. Essential hypertension          DISPOSITION / PLAN     DISPOSITION        PATIENT REFERRED TO:  [address] [place] [address]  [phone]  Call today  For follow up the next week for recheck and to get help with a primary care doctor.       DISCHARGE MEDICATIONS:  New Prescriptions    AMLODIPINE (NORVASC) 2.5 MG TABLET    Take 1 tablet by mouth daily for 14 days       Car Zavala MD  Emergency Medicine Resident    (Please note that portions of thisnote were completed with a voice recognition program.  Efforts were made to edit the dictations but occasionally words are mis-transcribed.)       Car Zavala MD  Resident  03/15/22 [id number]       Car Zavala MD  Resident  03/15/22 [id number]

## 2022-03-15 NOTE — ED NOTES
Pt arrived to ED with c/o hypertension. Pt also reports a lump on his back that he has had for 2 years now but states recently has started causing pain while bending over. Pt states he does not take any daily medications. Pt reports he was prescribed hypertension medications but never took them.  Pt A&Ox4, RR even/unlabored, call light within reach, pts wife @ bedside      Tavo Childress RN  03/15/22 [id number]

## 2022-03-15 NOTE — ED PROVIDER NOTES
HENT:      Head: Normocephalic and atraumatic. Right Ear: External ear normal.      Left Ear: External ear normal.   Eyes:      General: No scleral icterus. Right eye: No discharge. Left eye: No discharge. Neck:      Trachea: No tracheal deviation. Pulmonary:      Effort: Pulmonary effort is normal. No respiratory distress. Breath sounds: No stridor. Musculoskeletal:         General: Normal range of motion. Cervical back: Normal range of motion. Skin:     General: Skin is warm and dry. Neurological:      Mental Status: He is alert and oriented to person, place, and time. Coordination: Coordination normal.   Psychiatric:         Behavior: Behavior normal.           Comments  The patient presents with complaint of hypertension     Based on the patients history and physical exam the patient is not concerning for any serious pathology patient currently asymptomatic has not had any medications and likely has hypertension that needs to be treated since there is no signs of hypertensive emergency there is no signs of intracranial bleed no chest pain or other signs at this time patient be started on amlodipine will start 2.5 and recommend follow-up with the transition of care clinic in the [facility]    Treatment plan will include follow up with primary care doctor in 1 week      Kael Puckett DO,, DO, RDMS.   Attending Emergency Physician          Kael Puckett DO  03/15/22 [id number]

## 2022-03-18 LAB
EKG ATRIAL RATE: 65 BPM
EKG P AXIS: 60 DEGREES
EKG P-R INTERVAL: 146 MS
EKG Q-T INTERVAL: 368 MS
EKG QRS DURATION: 84 MS
EKG QTC CALCULATION (BAZETT): 382 MS
EKG R AXIS: 60 DEGREES
EKG T AXIS: 31 DEGREES
EKG VENTRICULAR RATE: 65 BPM

## 2022-11-10 ENCOUNTER — HOSPITAL ENCOUNTER (EMERGENCY)
Age: 52
Discharge: HOME | End: 2022-11-10
Attending: EMERGENCY MEDICINE

## 2022-11-10 ENCOUNTER — APPOINTMENT (OUTPATIENT)
Dept: GENERAL RADIOLOGY | Age: 52
End: 2022-11-10

## 2022-11-10 ENCOUNTER — APPOINTMENT (OUTPATIENT)
Dept: CT IMAGING | Age: 52
End: 2022-11-10

## 2022-11-10 VITALS
HEART RATE: 87 BPM | TEMPERATURE: 97 F | SYSTOLIC BLOOD PRESSURE: 184 MMHG | RESPIRATION RATE: 21 BRPM | DIASTOLIC BLOOD PRESSURE: 131 MMHG | OXYGEN SATURATION: 98 %

## 2022-11-10 DIAGNOSIS — I10 ESSENTIAL HYPERTENSION: Primary | ICD-10-CM

## 2022-11-10 LAB
ABSOLUTE EOS #: 0.12 K/UL (ref 0–0.44)
ABSOLUTE IMMATURE GRANULOCYTE: 0.05 K/UL (ref 0–0.3)
ABSOLUTE LYMPH #: 2.59 K/UL (ref 1.1–3.7)
ABSOLUTE MONO #: 0.55 K/UL (ref 0.1–1.2)
ALBUMIN SERPL-MCNC: 4.2 G/DL (ref 3.5–5.2)
ALBUMIN/GLOBULIN RATIO: 1.3 (ref 1–2.5)
ALP BLD-CCNC: 82 U/L (ref 40–129)
ALT SERPL-CCNC: 15 U/L (ref 5–41)
ANION GAP SERPL CALCULATED.3IONS-SCNC: 13 MMOL/L (ref 9–17)
AST SERPL-CCNC: 18 U/L
BASOPHILS # BLD: 1 % (ref 0–2)
BASOPHILS ABSOLUTE: 0.07 K/UL (ref 0–0.2)
BILIRUB SERPL-MCNC: 0.8 MG/DL (ref 0.3–1.2)
BILIRUBIN URINE: NEGATIVE
BUN BLDV-MCNC: 10 MG/DL (ref 6–20)
CALCIUM SERPL-MCNC: 9.5 MG/DL (ref 8.6–10.4)
CASTS UA: NORMAL /LPF (ref 0–8)
CHLORIDE BLD-SCNC: 106 MMOL/L (ref 98–107)
CO2: 23 MMOL/L (ref 20–31)
COLOR: YELLOW
CREAT SERPL-MCNC: 0.76 MG/DL (ref 0.7–1.2)
EOSINOPHILS RELATIVE PERCENT: 2 % (ref 1–4)
EPITHELIAL CELLS UA: NORMAL /HPF (ref 0–5)
GFR SERPL CREATININE-BSD FRML MDRD: >60 ML/MIN/1.73M2
GLUCOSE BLD-MCNC: 106 MG/DL (ref 70–99)
GLUCOSE URINE: NEGATIVE
HCT VFR BLD CALC: 42.2 % (ref 40.7–50.3)
HEMOGLOBIN: 14.1 G/DL (ref 13–17)
IMMATURE GRANULOCYTES: 1 %
KETONES, URINE: ABNORMAL
LEUKOCYTE ESTERASE, URINE: NEGATIVE
LIPASE: 52 U/L (ref 13–60)
LYMPHOCYTES # BLD: 34 % (ref 24–43)
MCH RBC QN AUTO: 27.1 PG (ref 25.2–33.5)
MCHC RBC AUTO-ENTMCNC: 33.4 G/DL (ref 28.4–34.8)
MCV RBC AUTO: 81 FL (ref 82.6–102.9)
MONOCYTES # BLD: 7 % (ref 3–12)
NITRITE, URINE: NEGATIVE
NRBC AUTOMATED: 0 PER 100 WBC
PDW BLD-RTO: 14 % (ref 11.8–14.4)
PH UA: 5.5 (ref 5–8)
PLATELET # BLD: 366 K/UL (ref 138–453)
PMV BLD AUTO: 9 FL (ref 8.1–13.5)
POTASSIUM SERPL-SCNC: 3.8 MMOL/L (ref 3.7–5.3)
PROTEIN UA: ABNORMAL
RBC # BLD: 5.21 M/UL (ref 4.21–5.77)
RBC # BLD: ABNORMAL 10*6/UL
RBC UA: NORMAL /HPF (ref 0–4)
SEG NEUTROPHILS: 56 % (ref 36–65)
SEGMENTED NEUTROPHILS ABSOLUTE COUNT: 4.36 K/UL (ref 1.5–8.1)
SODIUM BLD-SCNC: 142 MMOL/L (ref 135–144)
SPECIFIC GRAVITY UA: 1.02 (ref 1–1.03)
TOTAL PROTEIN: 7.4 G/DL (ref 6.4–8.3)
TROPONIN, HIGH SENSITIVITY: 8 NG/L (ref 0–22)
TROPONIN, HIGH SENSITIVITY: 8 NG/L (ref 0–22)
TURBIDITY: CLEAR
URINE HGB: NEGATIVE
UROBILINOGEN, URINE: NORMAL
WBC # BLD: 7.7 K/UL (ref 3.5–11.3)
WBC UA: NORMAL /HPF (ref 0–5)

## 2022-11-10 PROCEDURE — 71275 CT ANGIOGRAPHY CHEST: CPT

## 2022-11-10 PROCEDURE — 2500000003 HC RX 250 WO HCPCS: Performed by: STUDENT IN AN ORGANIZED HEALTH CARE EDUCATION/TRAINING PROGRAM

## 2022-11-10 PROCEDURE — 96376 TX/PRO/DX INJ SAME DRUG ADON: CPT

## 2022-11-10 PROCEDURE — 81001 URINALYSIS AUTO W/SCOPE: CPT

## 2022-11-10 PROCEDURE — 80053 COMPREHEN METABOLIC PANEL: CPT

## 2022-11-10 PROCEDURE — 74174 CTA ABD&PLVS W/CONTRAST: CPT

## 2022-11-10 PROCEDURE — 85025 COMPLETE CBC W/AUTO DIFF WBC: CPT

## 2022-11-10 PROCEDURE — 6370000000 HC RX 637 (ALT 250 FOR IP): Performed by: STUDENT IN AN ORGANIZED HEALTH CARE EDUCATION/TRAINING PROGRAM

## 2022-11-10 PROCEDURE — 96374 THER/PROPH/DIAG INJ IV PUSH: CPT

## 2022-11-10 PROCEDURE — 84484 ASSAY OF TROPONIN QUANT: CPT

## 2022-11-10 PROCEDURE — 93005 ELECTROCARDIOGRAM TRACING: CPT | Performed by: STUDENT IN AN ORGANIZED HEALTH CARE EDUCATION/TRAINING PROGRAM

## 2022-11-10 PROCEDURE — 6360000004 HC RX CONTRAST MEDICATION: Performed by: STUDENT IN AN ORGANIZED HEALTH CARE EDUCATION/TRAINING PROGRAM

## 2022-11-10 PROCEDURE — 99285 EMERGENCY DEPT VISIT HI MDM: CPT

## 2022-11-10 PROCEDURE — 71045 X-RAY EXAM CHEST 1 VIEW: CPT

## 2022-11-10 PROCEDURE — 83690 ASSAY OF LIPASE: CPT

## 2022-11-10 RX ORDER — AMLODIPINE BESYLATE 5 MG/1
5 TABLET ORAL DAILY
Qty: 30 TABLET | Refills: 0 | Status: SHIPPED | OUTPATIENT
Start: 2022-11-10

## 2022-11-10 RX ORDER — METOPROLOL TARTRATE 5 MG/5ML
5 INJECTION INTRAVENOUS ONCE
Status: COMPLETED | OUTPATIENT
Start: 2022-11-10 | End: 2022-11-10

## 2022-11-10 RX ORDER — AMLODIPINE BESYLATE 10 MG/1
5 TABLET ORAL ONCE
Status: COMPLETED | OUTPATIENT
Start: 2022-11-10 | End: 2022-11-10

## 2022-11-10 RX ADMIN — IOPAMIDOL 100 ML: 755 INJECTION, SOLUTION INTRAVENOUS at 16:09

## 2022-11-10 RX ADMIN — AMLODIPINE BESYLATE 5 MG: 10 TABLET ORAL at 17:45

## 2022-11-10 RX ADMIN — METOPROLOL TARTRATE 5 MG: 5 INJECTION, SOLUTION INTRAVENOUS at 15:35

## 2022-11-10 RX ADMIN — METOPROLOL TARTRATE 5 MG: 1 INJECTION, SOLUTION INTRAVENOUS at 14:26

## 2022-11-10 ASSESSMENT — ENCOUNTER SYMPTOMS
VOMITING: 0
SORE THROAT: 0
NAUSEA: 0
COUGH: 0
ABDOMINAL PAIN: 1
BACK PAIN: 1
DIARRHEA: 0
SHORTNESS OF BREATH: 0
RHINORRHEA: 0

## 2022-11-10 ASSESSMENT — PAIN SCALES - GENERAL: PAINLEVEL_OUTOF10: 7

## 2022-11-10 ASSESSMENT — PAIN - FUNCTIONAL ASSESSMENT: PAIN_FUNCTIONAL_ASSESSMENT: 0-10
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
## 2022-11-10 NOTE — ED PROVIDER NOTES
[facility]     Emergency Department     Faculty Attestation    I performed a history and physical examination of the patient and discussed management with the resident. I reviewed the resident´s note and agree with the documented findings and plan of care. Any areas of disagreement are noted on the chart. I was personally present for the key portions of any procedures. I have documented in the chart those procedures where I was not present during the key portions. I have reviewed the emergency nurses triage note. I agree with the chief complaint, past medical history, past surgical history, allergies, medications, social and family history as documented unless otherwise noted below. For Physician Assistant/ Nurse Practitioner cases/documentation I have personally evaluated this patient and have completed at least one if not all key elements of the E/M (history, physical exam, and MDM). Additional findings are as noted. Chest clear,  Heart exam normal , no pain or swelling on examination of the lower extremities , equal pulses both wrists , trachea midline. Abdomen is nontender without pulsatile mass or bruit. Patient has not been taking his blood pressure medication. He came in today because of back pain and leg pain and abdominal pain. Patient appears comfortable in no distress, skin is warm and dry.     Chacha Mccormick MD [address] [facility]  Attending Physician       EKG Interpretation    Interpreted by emergency department physician    Rhythm: normal sinus   Rate: normal/97  Axis: normal 53  Ectopy: none  Conduction: normal  ST Segments: no acute change  T Waves: no acute change  Q Waves: none    Clinical Impression: Normal EKG except for possible left atrial enlargement    MARLENA Alejandro MD  11/10/22 [id number]

## 2022-11-10 NOTE — DISCHARGE INSTRUCTIONS
Please call your primary care provider for follow up in the next 1-2 days. Check your blood pressure for the next 2 weeks, every day and write down. Check during different times during the day. You may need to take blood pressure medications for the remainder of your life. Limit the amount of salt that you use. Increase amount of exercise (3 - 4 times a week for minimal of 20 minutes each times). Eat fresh foods. PLEASE RETURN TO THE EMERGENCY DEPARTMENT IMMEDIATELY for worsening symptoms, or if you develop any concerning symptoms such as: high fever not relieved by acetaminophen (Tylenol) and/or ibuprofen (Motrin / Advil), chills, shortness of breath, chest pain, feeling of your heart fluttering or racing, persistent nausea and/or vomiting, vomiting up blood, blood in your stool, numbness, loss of consciousness, weakness or tingling in the arms or legs or change in color of the extremities, changes in mental status, persistent headache, blurry vision, loss of bladder / bowel control, unable to follow up with your physician, or other any other care or concern.

## 2022-11-10 NOTE — ED PROVIDER NOTES
[address] HANDOFF       Handoff taken on the following patient from prior Attending Physician:  Pt Name: Cecilia Tolentino  PCP:  No primary care provider on file. Attestation  I was available and discussed any additional care issues that arose and coordinated the management plans with the resident(s) caring for the patient during my duty period. Any areas of disagreement with resident's documentation of care or procedures are noted on the chart. I was personally present for the key portions of any/all procedures during my duty period. I have documented in the chart those procedures where I was not present during the key portions. CHIEF COMPLAINT       Chief Complaint   Patient presents with    Abdominal Pain    Back Pain    Nausea     X2 weeks           CURRENT MEDICATIONS     Previous Medications  Previous Medications    AMLODIPINE (NORVASC) 2.5 MG TABLET    Take 1 tablet by mouth daily for 14 days       Encounter Medications  Orders Placed This Encounter   Medications    metoprolol (LOPRESSOR) injection 5 mg    metoprolol (LOPRESSOR) injection 5 mg    iopamidol (ISOVUE-370) 76 % injection 100 mL       ALLERGIES     is allergic to penicillins. RECENT VITALS:   Temp: 97 °F (36.1 °C),  Heart Rate: 66, Resp: 13, BP: (!) 205/131    RADIOLOGY:   XR CHEST PORTABLE   Final Result   Unremarkable portable chest radiograph.          CTA CHEST W WO CONTRAST    (Results Pending)   CTA ABDOMEN PELVIS W CONTRAST    (Results Pending)       LABS:  Labs Reviewed   CBC WITH AUTO DIFFERENTIAL - Abnormal; Notable for the following components:       Result Value    MCV 81.0 (*)     Immature Granulocytes 1 (*)     All other components within normal limits   COMPREHENSIVE METABOLIC PANEL - Abnormal; Notable for the following components:    Glucose 106 (*)     All other components within normal limits   URINALYSIS WITH REFLEX TO CULTURE - Abnormal; Notable for the following components: Ketones, Urine TRACE (*)     Protein, UA 2+ (*)     All other components within normal limits   LIPASE   TROPONIN   MICROSCOPIC URINALYSIS   TROPONIN           PLAN/ TASKS OUTSTANDING     This patient is a 45 y.o. Male. Patient came in for abdominal pain, back pain, body aches however had a blood pressure in the 200s, there was concern for aortic aneurysm so a CTA was ordered. Heart rate came down with metoprolol however blood pressure still in the 200s. CTA is completed, patient will need to be reassessed and blood pressure likely controlled with oral or IV meds.     (Please note that portions of this note were completed with a voice recognition program.  Efforts were made to edit the dictations but occasionally words are mis-transcribed.)    Christiane Gómez MD,, MD  Attending Emergency Physician       Christiane Gómez MD  11/10/22 4721

## 2022-11-10 NOTE — ED NOTES
Patient a&ox4 and agreeable to discharge. Patient education briefly went over and patient states that he can read on his own as he is in a hurry. Two rx provided. Skin pwd, rr even and unlabored, no s/sx of distress prior to discharge.      Jose Alfredo Thrasher RN  11/10/22 5812

## 2022-11-12 LAB
EKG ATRIAL RATE: 97 BPM
EKG P AXIS: 61 DEGREES
EKG P-R INTERVAL: 136 MS
EKG Q-T INTERVAL: 338 MS
EKG QRS DURATION: 90 MS
EKG QTC CALCULATION (BAZETT): 429 MS
EKG R AXIS: 53 DEGREES
EKG T AXIS: 31 DEGREES
EKG VENTRICULAR RATE: 97 BPM

## 2024-09-25 ENCOUNTER — APPOINTMENT (OUTPATIENT)
Dept: GENERAL RADIOLOGY | Age: 54
End: 2024-09-25

## 2024-09-25 ENCOUNTER — HOSPITAL ENCOUNTER (EMERGENCY)
Age: 54
Discharge: HOME | End: 2024-09-25
Attending: STUDENT IN AN ORGANIZED HEALTH CARE EDUCATION/TRAINING PROGRAM

## 2024-09-25 VITALS
OXYGEN SATURATION: 100 % | TEMPERATURE: 97.2 F | HEIGHT: 70 IN | RESPIRATION RATE: 17 BRPM | DIASTOLIC BLOOD PRESSURE: 105 MMHG | BODY MASS INDEX: 32.16 KG/M2 | SYSTOLIC BLOOD PRESSURE: 169 MMHG | HEART RATE: 98 BPM | WEIGHT: 224.65 LBS

## 2024-09-25 DIAGNOSIS — M79.605 LEFT LEG PAIN: ICD-10-CM

## 2024-09-25 DIAGNOSIS — M79.604 RIGHT LEG PAIN: Primary | ICD-10-CM

## 2024-09-25 DIAGNOSIS — I10 PRIMARY HYPERTENSION: ICD-10-CM

## 2024-09-25 LAB
ALBUMIN SERPL-MCNC: 4.4 G/DL (ref 3.5–5.2)
ALBUMIN/GLOB SERPL: 1 {RATIO} (ref 1–2.5)
ALP SERPL-CCNC: 105 U/L (ref 40–129)
ALT SERPL-CCNC: 38 U/L (ref 10–50)
ANION GAP SERPL CALCULATED.3IONS-SCNC: 11 MMOL/L (ref 9–16)
AST SERPL-CCNC: 40 U/L (ref 10–50)
BASOPHILS # BLD: 0.05 K/UL (ref 0–0.2)
BASOPHILS NFR BLD: 1 % (ref 0–2)
BILIRUB SERPL-MCNC: 0.7 MG/DL (ref 0–1.2)
BUN SERPL-MCNC: 13 MG/DL (ref 6–20)
CALCIUM SERPL-MCNC: 9.2 MG/DL (ref 8.6–10.4)
CHLORIDE SERPL-SCNC: 106 MMOL/L (ref 98–107)
CK SERPL-CCNC: 227 U/L (ref 39–308)
CO2 SERPL-SCNC: 24 MMOL/L (ref 20–31)
CREAT SERPL-MCNC: 0.8 MG/DL (ref 0.7–1.2)
D DIMER PPP FEU-MCNC: 0.71 UG/ML FEU (ref 0–0.57)
EOSINOPHIL # BLD: 0.3 K/UL (ref 0–0.44)
EOSINOPHILS RELATIVE PERCENT: 4 % (ref 1–4)
ERYTHROCYTE [DISTWIDTH] IN BLOOD BY AUTOMATED COUNT: 16.2 % (ref 11.8–14.4)
GFR, ESTIMATED: >90 ML/MIN/1.73M2
GLUCOSE SERPL-MCNC: 83 MG/DL (ref 74–99)
HCT VFR BLD AUTO: 44.7 % (ref 40.7–50.3)
HGB BLD-MCNC: 14.3 G/DL (ref 13–17)
IMM GRANULOCYTES # BLD AUTO: <0.03 K/UL (ref 0–0.3)
IMM GRANULOCYTES NFR BLD: 0 %
LYMPHOCYTES NFR BLD: 2.5 K/UL (ref 1.1–3.7)
LYMPHOCYTES RELATIVE PERCENT: 35 % (ref 24–43)
MCH RBC QN AUTO: 26.4 PG (ref 25.2–33.5)
MCHC RBC AUTO-ENTMCNC: 32 G/DL (ref 28.4–34.8)
MCV RBC AUTO: 82.6 FL (ref 82.6–102.9)
MONOCYTES NFR BLD: 0.62 K/UL (ref 0.1–1.2)
MONOCYTES NFR BLD: 9 % (ref 3–12)
MYOGLOBIN SERPL-MCNC: 42 NG/ML (ref 28–72)
NEUTROPHILS NFR BLD: 51 % (ref 36–65)
NEUTS SEG NFR BLD: 3.69 K/UL (ref 1.5–8.1)
NRBC BLD-RTO: 0 PER 100 WBC
PLATELET # BLD AUTO: 396 K/UL (ref 138–453)
PMV BLD AUTO: 8.9 FL (ref 8.1–13.5)
POTASSIUM SERPL-SCNC: 4 MMOL/L (ref 3.7–5.3)
PROT SERPL-MCNC: 7.7 G/DL (ref 6.6–8.7)
RBC # BLD AUTO: 5.41 M/UL (ref 4.21–5.77)
RBC # BLD: ABNORMAL 10*6/UL
SODIUM SERPL-SCNC: 141 MMOL/L (ref 136–145)
WBC OTHER # BLD: 7.2 K/UL (ref 3.5–11.3)

## 2024-09-25 PROCEDURE — 85379 FIBRIN DEGRADATION QUANT: CPT

## 2024-09-25 PROCEDURE — 6360000002 HC RX W HCPCS: Performed by: STUDENT IN AN ORGANIZED HEALTH CARE EDUCATION/TRAINING PROGRAM

## 2024-09-25 PROCEDURE — 85025 COMPLETE CBC W/AUTO DIFF WBC: CPT

## 2024-09-25 PROCEDURE — 73502 X-RAY EXAM HIP UNI 2-3 VIEWS: CPT

## 2024-09-25 PROCEDURE — 80053 COMPREHEN METABOLIC PANEL: CPT

## 2024-09-25 PROCEDURE — 99284 EMERGENCY DEPT VISIT MOD MDM: CPT

## 2024-09-25 PROCEDURE — 96374 THER/PROPH/DIAG INJ IV PUSH: CPT

## 2024-09-25 PROCEDURE — 83874 ASSAY OF MYOGLOBIN: CPT

## 2024-09-25 PROCEDURE — 6370000000 HC RX 637 (ALT 250 FOR IP): Performed by: STUDENT IN AN ORGANIZED HEALTH CARE EDUCATION/TRAINING PROGRAM

## 2024-09-25 PROCEDURE — 82550 ASSAY OF CK (CPK): CPT

## 2024-09-25 RX ORDER — KETOROLAC TROMETHAMINE 30 MG/ML
30 INJECTION, SOLUTION INTRAMUSCULAR; INTRAVENOUS ONCE
Status: COMPLETED | OUTPATIENT
Start: 2024-09-25 | End: 2024-09-25

## 2024-09-25 RX ORDER — AMLODIPINE BESYLATE 5 MG/1
5 TABLET ORAL DAILY
Qty: 30 TABLET | Refills: 0 | Status: SHIPPED | OUTPATIENT
Start: 2024-09-25 | End: 2024-10-25

## 2024-09-25 RX ORDER — GABAPENTIN 100 MG/1
100 CAPSULE ORAL 3 TIMES DAILY
Qty: 30 CAPSULE | Refills: 0 | Status: SHIPPED | OUTPATIENT
Start: 2024-09-25 | End: 2024-10-05

## 2024-09-25 RX ORDER — AMLODIPINE BESYLATE 10 MG/1
5 TABLET ORAL DAILY
Status: DISCONTINUED | OUTPATIENT
Start: 2024-09-25 | End: 2024-09-25 | Stop reason: HOSPADM

## 2024-09-25 RX ORDER — GABAPENTIN 100 MG/1
100 CAPSULE ORAL ONCE
Status: COMPLETED | OUTPATIENT
Start: 2024-09-25 | End: 2024-09-25

## 2024-09-25 RX ADMIN — GABAPENTIN 100 MG: 100 CAPSULE ORAL at 17:43

## 2024-09-25 RX ADMIN — KETOROLAC TROMETHAMINE 30 MG: 30 INJECTION, SOLUTION INTRAMUSCULAR; INTRAVENOUS at 16:34

## 2024-09-25 RX ADMIN — AMLODIPINE BESYLATE 5 MG: 10 TABLET ORAL at 14:56

## 2024-09-25 ASSESSMENT — PAIN - FUNCTIONAL ASSESSMENT
PAIN_FUNCTIONAL_ASSESSMENT: 0-10
PAIN_FUNCTIONAL_ASSESSMENT: PREVENTS OR INTERFERES SOME ACTIVE ACTIVITIES AND ADLS

## 2024-09-25 ASSESSMENT — LIFESTYLE VARIABLES
HOW OFTEN DO YOU HAVE A DRINK CONTAINING ALCOHOL: 2-3 TIMES A WEEK
HOW MANY STANDARD DRINKS CONTAINING ALCOHOL DO YOU HAVE ON A TYPICAL DAY: 5 OR 6

## 2024-09-25 ASSESSMENT — PAIN DESCRIPTION - ORIENTATION: ORIENTATION: RIGHT;LEFT

## 2024-09-25 ASSESSMENT — PAIN DESCRIPTION - DESCRIPTORS: DESCRIPTORS: BURNING;PRESSURE

## 2024-09-25 ASSESSMENT — PAIN DESCRIPTION - LOCATION: LOCATION: LEG

## 2024-09-25 ASSESSMENT — PAIN SCALES - GENERAL
PAINLEVEL_OUTOF10: 10
PAINLEVEL_OUTOF10: 6

## 2024-09-25 ASSESSMENT — PAIN DESCRIPTION - PAIN TYPE: TYPE: ACUTE PAIN

## 2024-09-25 NOTE — ED TRIAGE NOTES
Patient presents to the ED via triage. Patient states that he has had bilateral leg pain radiating from the hips to his feet for the past month. Patient was placed on bedside monitor and IV established. Patient is in NAD, respirations are even and unlabored, bed in lowest position and call light with in reach. Resident is bedside for evaluation. Writer will continue with plan of care.

## 2024-09-25 NOTE — ED PROVIDER NOTES
[facility]  FACULTY HANDOFF     3:09 PM EDT  Handoff taken on the following patient from prior Attending Physician:  Pt Name: Misha Yeseniaeduar  PCP:  No primary care provider on file.    Attestation  I was available and discussed any additional care issues that arose and coordinated the management plans with the resident(s) caring for the patient during my duty period. Any areas of disagreement with resident's documentation of care or procedures are noted on the chart. I was personally present for the key portions of any/all procedures during my duty period. I have documented in the chart those procedures where I was not present during the key portions.         CHIEF COMPLAINT       Chief Complaint   Patient presents with    Medication Refill    Leg Pain     Bilateral leg pain , hip down wards     Hypertension         CURRENT MEDICATIONS     Previous Medications  Previous Medications    AMLODIPINE (NORVASC) 5 MG TABLET    Take 1 tablet by mouth daily    BLOOD PRESSURE MONITORING (COMFORT TOUCH BP CUFF/LARGE) MISC    Take blood pressure twice daily and record       Encounter Medications  Orders Placed This Encounter   Medications    amLODIPine (NORVASC) 5 MG tablet     Sig: Take 1 tablet by mouth daily     Dispense:  30 tablet     Refill:  0    amLODIPine (NORVASC) tablet 5 mg       ALLERGIES     is allergic to penicillins.      RECENT VITALS:   Temp: 97.2 °F (36.2 °C),  Pulse: 98, Respirations: 17, BP: (!) 195/116    RADIOLOGY:   XR PELVIS (MIN 3 VIEWS)    (Results Pending)       LABS:  Labs Reviewed   COMPREHENSIVE METABOLIC PANEL   CBC WITH AUTO DIFFERENTIAL   D-DIMER, QUANTITATIVE   CK   MYOGLOBIN, BLOOD   CK   MYOGLOBIN, BLOOD     Pain down both legs for the past month, circumferential, no numbness or weakness.  No low back pain.  No bowel or bladder incontinence or retention, no joint pain.  Pain is there when he awakens, is worse through his day of work, does not have PCP, new to the area  On
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
## 2024-09-25 NOTE — DISCHARGE INSTRUCTIONS
Friday  8:30a - 4:15p  Wednesday 12:30p - 4:15p   Health Department  [facility]  [address]  [phone] Pediatric Primary Care  Adult Primary Care  OB/Prenatal Monday - Wednesday, Friday Monday - Friday 8a - 12p  Thursday 8a - 4:45p   [facility]  [address]  [phone]  [address] [place] [address] [address] [phone] Pregnancy  Pre & Post Adoption Counseling  Pregnancy Support  Prenatal / nutrition Care  Reward Incentive Program [facility]  Mon, Tues, Wed, Fri 10:00a - 4:30p  Thur 10:00a - 7:30p  [address] Location  Monday - Friday 10a - 4:30p   [facility]   OB/GYN  [address]   [address]  [phone]  Adult Internal Medicine  [address] [facility]  [phone]  Pediatrics  [address]  [phone]  Neuro / Headache  [address],   [address]  [phone] Monday - Friday  8:30a - 5p   [facility]  [address]  [phone] [facility] Practice Mon, Tues, Thurs, Fri  9:00a - 4:30p  Wed 1:00p - 4:30p   [facility]  [address]  [phone] Family Practice Monday - Friday  8:30a - 5:00p     [facility]   Specialty Clinics  [address]  [phone] Burn/Plastic, GI, Orthopedics, Surgical / Trauma, Urology, Vascular Monday - Friday 8:00 - 4:30p    Call for an appointment   [facility]  [address]  [phone] Adult Medicine, Eye Clinic, Dental  Patient must be certified homeless  Under age 18 not accepted Monday - Friday 8:00 - 4:30p   [facility]  [address] [facility] [address]  [phone]  OB   [phone] Monday, Tuesday, Wednesday, Friday 9a - 5p  Thursday 9a - 6p  Wednesday (OB only)     [facility]  [address]  [phone] OB/Prenatal Monday 11a -7p  Tues, Wed, Thur 9a - 5p  Friday 8a - 4p  1st Saturday 9a -1p   Podiatry Clinic  [address]  [phone]  Monday - Friday 8:00 - 4:30 p   Pregnancy Center [facility]  [address]  [phone] Free nurse visits  Lynn programs  Counseling  Pregnancy Class Call or walk in   [facility]  [address]  [phone] Various Clinics 8a - 5:30p   [facility] Family Medicine  [facility]  [address]  [phone] Family Practice Monday - Friday  8a - 4:30p   [facility]  [address] [place] [address] [id number] [phone] [address] [place] [address]  [phone]    Monday - Friday  8a - 4:30p    Monday - Friday 8a - 4:30p  Thursday 8a - 8p     Outpatient Clinics     Asthma Management Clinic  [facility] [address]  [address]  [phone]  Monday - Friday  9a - 5p   Diabetic Education Services  [phone]  Call for an appointment   [facility]  Heart Failure Clinic  [address] [facility]  [phone]  Monday - Friday  8:30a - 4p   Dental Services     [facility]  [address] [facility] [facility]  [phone] Must have source of income and must bring (2) recent check stubs to appointment By appointment only   [facility]  [address]  [phone] Patient must be homeless, call for   eligibility guidelines.  Under age 18 NOT accepted Days and hours vary (Doors open at 8:30a - day of week varies)  Call for an appointment   Miscellaneous Information     [facility] First Call for Help  [phone]  Call for an appointment   H.E.L.P   (Hospital Eligibility Link Program)  [phone]   toll free [phone] For financial assistance

## 2025-05-19 ENCOUNTER — HOSPITAL ENCOUNTER (EMERGENCY)
Age: 55
Discharge: HOME | End: 2025-05-19
Attending: STUDENT IN AN ORGANIZED HEALTH CARE EDUCATION/TRAINING PROGRAM

## 2025-05-19 VITALS
SYSTOLIC BLOOD PRESSURE: 175 MMHG | OXYGEN SATURATION: 100 % | TEMPERATURE: 97.9 F | HEART RATE: 60 BPM | RESPIRATION RATE: 20 BRPM | DIASTOLIC BLOOD PRESSURE: 98 MMHG

## 2025-05-19 DIAGNOSIS — K08.89 PAIN, DENTAL: ICD-10-CM

## 2025-05-19 DIAGNOSIS — I10 ASYMPTOMATIC HYPERTENSION: Primary | ICD-10-CM

## 2025-05-19 PROCEDURE — 6370000000 HC RX 637 (ALT 250 FOR IP)

## 2025-05-19 PROCEDURE — 93005 ELECTROCARDIOGRAM TRACING: CPT | Performed by: STUDENT IN AN ORGANIZED HEALTH CARE EDUCATION/TRAINING PROGRAM

## 2025-05-19 PROCEDURE — 99283 EMERGENCY DEPT VISIT LOW MDM: CPT

## 2025-05-19 RX ORDER — CLINDAMYCIN HYDROCHLORIDE 300 MG/1
300 CAPSULE ORAL 3 TIMES DAILY
Qty: 21 CAPSULE | Refills: 0 | Status: SHIPPED | OUTPATIENT
Start: 2025-05-19 | End: 2025-05-26

## 2025-05-19 RX ORDER — AMLODIPINE BESYLATE 10 MG/1
10 TABLET ORAL ONCE
Status: COMPLETED | OUTPATIENT
Start: 2025-05-19 | End: 2025-05-19

## 2025-05-19 RX ORDER — ACETAMINOPHEN 500 MG
1000 TABLET ORAL ONCE
Status: COMPLETED | OUTPATIENT
Start: 2025-05-19 | End: 2025-05-19

## 2025-05-19 RX ORDER — CLINDAMYCIN HYDROCHLORIDE 150 MG/1
300 CAPSULE ORAL ONCE
Status: COMPLETED | OUTPATIENT
Start: 2025-05-19 | End: 2025-05-19

## 2025-05-19 RX ORDER — ACETAMINOPHEN 500 MG
1000 TABLET ORAL 4 TIMES DAILY PRN
Qty: 40 TABLET | Refills: 0 | Status: SHIPPED | OUTPATIENT
Start: 2025-05-19 | End: 2025-05-24

## 2025-05-19 RX ORDER — AMLODIPINE BESYLATE 10 MG/1
10 TABLET ORAL DAILY
Qty: 30 TABLET | Refills: 0 | Status: SHIPPED | OUTPATIENT
Start: 2025-05-19 | End: 2025-06-18

## 2025-05-19 RX ADMIN — ACETAMINOPHEN 1000 MG: 500 TABLET, FILM COATED ORAL at 14:21

## 2025-05-19 RX ADMIN — AMLODIPINE BESYLATE 10 MG: 10 TABLET ORAL at 14:21

## 2025-05-19 RX ADMIN — CLINDAMYCIN HYDROCHLORIDE 300 MG: 150 CAPSULE ORAL at 14:20

## 2025-05-19 ASSESSMENT — PAIN SCALES - GENERAL: PAINLEVEL_OUTOF10: 6

## 2025-05-19 ASSESSMENT — LIFESTYLE VARIABLES
HOW OFTEN DO YOU HAVE A DRINK CONTAINING ALCOHOL: MONTHLY OR LESS
HOW MANY STANDARD DRINKS CONTAINING ALCOHOL DO YOU HAVE ON A TYPICAL DAY: 1 OR 2

## 2025-05-19 ASSESSMENT — PAIN DESCRIPTION - LOCATION: LOCATION: TEETH

## 2025-05-19 NOTE — DISCHARGE INSTRUCTIONS
You were seen today for dental pain and high blood pressure.  Please take care and blood pressure medication as prescribed.  Please take your antibiotic as prescribed 2.  Please follow-up with dentist soon as possible.  Please fracture care with a PCP     call today or tomorrow for a follow up with your dentist tomorrow, [facility] or [facility] at [phone] or one of the dentists provided for follow up.    Take your medication as indicated, if you are given an antibiotic then make sure you get the prescription filled and take the antibiotics until finished.  Drink plenty of water while taking the antibiotics.  Avoid drinking alcohol while taking antibiotics.     [facility], [facility], [facility] has some antibiotics for free; [facility] and [facility] has a 4 dollar prescription plan for some antibiotics.    Use ibuprofen or Tylenol (unless prescribed medications that have Tylenol in it) for pain.  You can take over the counter Ibuprofen (advil) tablets (4 every 8 hours or 3 every 6 hours or 2 every 4 hours).  You can also use over the counter orajel as directed.    Return to the Emergency Department for fever > 101.5, swelling to face, redness on face, drainage from tooth, any other care or concern.

## 2025-05-19 NOTE — ED NOTES
Pt state she quit taking bp meds a year ago, does not have a pcp . States he was too busy to take his meds, requesting antibiotics for dental issues. Wanting to leave soon as he has a toddler that is with a family member and he wants to go check on him , denies cp and sob, no symptoms today . All past visits to the ED are for same symptoms he is concerned for today . Dental and htn but does not have a dentist also and dropped his insurance so he is limited in care d/t payment,   Pt using the ED as a pcp.

## 2025-05-19 NOTE — ED NOTES
Writer updated that patient is requesting a [employer] [facility] Medicaid applications.  Writer was able to print the application from online and provided patient with application.  Writer provided address to [facility] to drop off the application, as well as, the email address the documents could be uploaded to.  Patient reported understanding and denied further needs.

## 2025-05-20 LAB
EKG ATRIAL RATE: 80 BPM
EKG P AXIS: 69 DEGREES
EKG P-R INTERVAL: 142 MS
EKG Q-T INTERVAL: 372 MS
EKG QRS DURATION: 86 MS
EKG QTC CALCULATION (BAZETT): 429 MS
EKG R AXIS: 28 DEGREES
EKG T AXIS: 34 DEGREES
EKG VENTRICULAR RATE: 80 BPM

## 2025-05-20 PROCEDURE — 93010 ELECTROCARDIOGRAM REPORT: CPT | Performed by: INTERNAL MEDICINE

## 2025-05-22 ASSESSMENT — ENCOUNTER SYMPTOMS
SHORTNESS OF BREATH: 0
COUGH: 0
ABDOMINAL PAIN: 0

## 2025-05-22 NOTE — ED PROVIDER NOTES
[facility]     Emergency Department     Faculty Attestation    I performed a history and physical examination of the patient and discussed management with the resident. I have reviewed and agree with the resident’s findings including all diagnostic interpretations, and treatment plans as written at the time of my review. Any areas of disagreement are noted on the chart. I was personally present for the key portions of any procedures. I have documented in the chart those procedures where I was not present during the key portions. For Physician Assistant/ Nurse Practitioner cases/documentation I have personally evaluated this patient and have completed at least one if not all key elements of the E/M (history, physical exam, and MDM). Additional findings are as noted.    PtName: Misha Goodson  MRN: [id number]  Birthdate 1970  Date of evaluation: 5/19/25  Note Started: 2:11 PM EDT    Primary Care Physician: No primary care provider on file.    Brief HPI:  54-year-old male presents emergency department with hypertension.  The patient was evaluated by EMS for his dental pain today, was found to have high blood pressure, was instructed to report to the emergency department for evaluation.  The patient denies any symptoms including severe headache, vision changes, chest pain, or shortness of breath.  The patient has history of hypertension reports he has not taken his medication in about 1 year.    Pertinent Physical Exam Findings:  Vitals:    05/19/25 1347   BP: (!) 189/127   Pulse: 66   Resp: 20   Temp: 97.9 °F (36.6 °C)   SpO2: 100%   Appears well, resting comfortably, no acute distress, dentition is poor, no periapical abscess visualized, no area fluctuance palpated, no sublingual or submandibular tenderness    Medical Decision Making: Patient is a 54 y.o. male presenting to the emergency department with hypertension and dental pain. The chart was reviewed for pertinent 
Department  [address] [facility] [address]  [phone]    As needed, If symptoms worsen    [facility] AT [facility]  [address] [facility] [address] [phone]    Establish care with a primary care provider      DISCHARGE MEDICATIONS:  Discharge Medication List as of 5/19/2025  2:44 PM        START taking these medications    Details   clindamycin (CLEOCIN) 300 MG capsule Take 1 capsule by mouth 3 times daily for 7 days, Disp-21 capsule, R-0Normal      acetaminophen (TYLENOL) 500 MG tablet Take 2 tablets by mouth 4 times daily as needed for Pain, Disp-40 tablet, R-0Normal             Radha Alvarado MD  Emergency Medicine Resident    (Please note that portions of thisnote were completed with a voice recognition program.  Efforts were made to edit the dictations but occasionally words are mis-transcribed.)
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
